# Patient Record
Sex: FEMALE | Race: BLACK OR AFRICAN AMERICAN | NOT HISPANIC OR LATINO | Employment: FULL TIME | URBAN - METROPOLITAN AREA
[De-identification: names, ages, dates, MRNs, and addresses within clinical notes are randomized per-mention and may not be internally consistent; named-entity substitution may affect disease eponyms.]

---

## 2019-04-18 PROBLEM — R71.8 MICROCYTOSIS: Status: ACTIVE | Noted: 2019-04-18

## 2019-05-03 ENCOUNTER — HOSPITAL ENCOUNTER (OUTPATIENT)
Dept: SLEEP MEDICINE | Facility: HOSPITAL | Age: 64
Discharge: HOME | End: 2019-05-03
Attending: SPECIALIST
Payer: MEDICARE

## 2019-05-03 DIAGNOSIS — G47.33 OBSTRUCTIVE SLEEP APNEA (ADULT) (PEDIATRIC): ICD-10-CM

## 2019-05-03 PROCEDURE — 95810 POLYSOM 6/> YRS 4/> PARAM: CPT

## 2019-05-24 ENCOUNTER — HOSPITAL ENCOUNTER (OUTPATIENT)
Dept: SLEEP MEDICINE | Facility: HOSPITAL | Age: 64
Discharge: HOME | End: 2019-05-24
Attending: SPECIALIST
Payer: MEDICARE

## 2019-05-24 DIAGNOSIS — G47.33 OBSTRUCTIVE SLEEP APNEA (ADULT) (PEDIATRIC): ICD-10-CM

## 2019-05-24 PROCEDURE — 95811 POLYSOM 6/>YRS CPAP 4/> PARM: CPT

## 2020-01-07 PROBLEM — R76.11 POSITIVE PPD: Status: ACTIVE | Noted: 2020-01-07

## 2020-01-07 PROBLEM — D50.9 MICROCYTIC ANEMIA: Status: ACTIVE | Noted: 2019-04-18

## 2020-01-07 PROBLEM — D50.9 MICROCYTIC ANEMIA: Chronic | Status: ACTIVE | Noted: 2019-04-18

## 2020-03-11 PROBLEM — S81.012A KNEE LACERATION, LEFT, INITIAL ENCOUNTER: Status: ACTIVE | Noted: 2020-03-11

## 2020-04-30 PROBLEM — M79.642 LEFT HAND PAIN: Status: ACTIVE | Noted: 2020-04-30

## 2021-04-08 PROBLEM — S81.012A KNEE LACERATION, LEFT, INITIAL ENCOUNTER: Status: RESOLVED | Noted: 2020-03-11 | Resolved: 2021-04-08

## 2021-05-19 PROBLEM — Z71.3 DIETARY COUNSELING: Chronic | Status: ACTIVE | Noted: 2021-05-19

## 2021-05-19 PROBLEM — Z51.81 THERAPEUTIC DRUG MONITORING: Chronic | Status: ACTIVE | Noted: 2021-05-19

## 2022-01-11 PROBLEM — U07.1 COVID-19 VIRUS INFECTION: Chronic | Status: ACTIVE | Noted: 2021-12-30

## 2022-01-11 PROBLEM — I89.0 LYMPHEDEMA OF BOTH LOWER EXTREMITIES: Status: ACTIVE | Noted: 2022-01-11

## 2022-01-11 PROBLEM — U07.1 COVID-19 VIRUS INFECTION: Status: ACTIVE | Noted: 2022-01-11

## 2022-03-08 PROBLEM — Z86.16 HISTORY OF 2019 NOVEL CORONAVIRUS DISEASE (COVID-19): Chronic | Status: ACTIVE | Noted: 2021-12-30

## 2022-04-19 PROBLEM — E55.9 VITAMIN D DEFICIENCY: Status: ACTIVE | Noted: 2022-04-19

## 2022-04-19 PROBLEM — I89.0 LYMPHEDEMA OF BOTH LOWER EXTREMITIES: Chronic | Status: ACTIVE | Noted: 2022-01-11

## 2022-05-19 PROBLEM — N90.60 HYPERTROPHY OF VULVA: Status: ACTIVE | Noted: 2022-05-19

## 2022-10-20 PROBLEM — E78.00 PURE HYPERCHOLESTEROLEMIA: Status: ACTIVE | Noted: 2022-10-20

## 2022-10-20 PROBLEM — J30.9 CHRONIC ALLERGIC RHINITIS: Status: ACTIVE | Noted: 2022-10-20

## 2022-10-20 PROBLEM — R73.01 IMPAIRED FASTING GLUCOSE: Status: ACTIVE | Noted: 2022-10-20

## 2022-10-20 PROBLEM — R10.9 ABDOMINAL CRAMPING: Chronic | Status: ACTIVE | Noted: 2022-10-20

## 2022-10-20 PROBLEM — R73.03 PREDIABETES: Chronic | Status: ACTIVE | Noted: 2022-10-20

## 2022-11-12 ENCOUNTER — HOSPITAL ENCOUNTER (OUTPATIENT)
Dept: SLEEP MEDICINE | Facility: HOSPITAL | Age: 67
Discharge: HOME | End: 2022-11-12
Attending: SPECIALIST
Payer: MEDICARE

## 2022-11-12 DIAGNOSIS — G47.33 OBSTRUCTIVE SLEEP APNEA (ADULT) (PEDIATRIC): Primary | ICD-10-CM

## 2022-11-12 PROCEDURE — 95811 POLYSOM 6/>YRS CPAP 4/> PARM: CPT

## 2022-11-14 PROBLEM — G47.33 OBSTRUCTIVE SLEEP APNEA (ADULT) (PEDIATRIC): Status: ACTIVE | Noted: 2022-11-14

## 2023-04-19 PROBLEM — G47.33 OBSTRUCTIVE SLEEP APNEA (ADULT) (PEDIATRIC): Status: RESOLVED | Noted: 2022-11-14 | Resolved: 2023-04-19

## 2023-04-19 PROBLEM — E55.9 VITAMIN D DEFICIENCY: Chronic | Status: ACTIVE | Noted: 2022-04-19

## 2023-04-19 PROBLEM — E78.00 PURE HYPERCHOLESTEROLEMIA: Chronic | Status: ACTIVE | Noted: 2022-10-20

## 2023-10-18 PROBLEM — Z71.3 DIETARY COUNSELING: Chronic | Status: RESOLVED | Noted: 2021-05-19 | Resolved: 2023-10-18

## 2023-10-18 PROBLEM — I25.10 ASCVD (ARTERIOSCLEROTIC CARDIOVASCULAR DISEASE): Status: ACTIVE | Noted: 2023-07-05

## 2023-10-18 PROBLEM — Z51.81 THERAPEUTIC DRUG MONITORING: Chronic | Status: RESOLVED | Noted: 2021-05-19 | Resolved: 2023-10-18

## 2023-10-18 PROBLEM — R10.9 ABDOMINAL CRAMPING: Chronic | Status: RESOLVED | Noted: 2022-10-20 | Resolved: 2023-10-18

## 2024-11-11 PROBLEM — M19.011 PRIMARY OSTEOARTHRITIS OF RIGHT SHOULDER: Status: ACTIVE | Noted: 2024-11-11

## 2025-03-21 ENCOUNTER — OCCUPATIONAL HEALTH (OUTPATIENT)
Dept: URGENT CARE | Facility: CLINIC | Age: 70
End: 2025-03-21

## 2025-03-21 DIAGNOSIS — Z13.9 ENCOUNTER FOR SCREENING: Primary | ICD-10-CM

## 2025-03-21 LAB
CTP QC/QA: YES
POC 5 PANEL DRUG SCREEN: NEGATIVE

## 2025-03-21 NOTE — PROGRESS NOTES
Subjective:      Patient ID: Philomena Alexander is a 69 y.o. female.    Vitals:  vitals were not taken for this visit.     Chief Complaint: Drug / Alcohol Assessment    Pt presented in clinic for rapid 5 ds. [facility] johnnie verified by an.    Drug / Alcohol Assessment  ROS   Objective:     Physical Exam    Assessment:     1. Encounter for screening        Plan:       Encounter for screening  -     POCT Rapid Drug Screen 5 Panel